# Patient Record
Sex: MALE | Race: WHITE | NOT HISPANIC OR LATINO | Employment: STUDENT | URBAN - METROPOLITAN AREA
[De-identification: names, ages, dates, MRNs, and addresses within clinical notes are randomized per-mention and may not be internally consistent; named-entity substitution may affect disease eponyms.]

---

## 2023-06-07 ENCOUNTER — OFFICE VISIT (OUTPATIENT)
Dept: PEDIATRICS | Facility: CLINIC | Age: 18
End: 2023-06-07
Payer: COMMERCIAL

## 2023-06-07 VITALS — BODY MASS INDEX: 20.69 KG/M2 | WEIGHT: 159 LBS

## 2023-06-07 DIAGNOSIS — R21 RASH: Primary | ICD-10-CM

## 2023-06-07 PROCEDURE — 99213 OFFICE O/P EST LOW 20 MIN: CPT | Performed by: PEDIATRICS

## 2023-06-07 RX ORDER — PREDNISONE 20 MG/1
TABLET ORAL
Qty: 15 TABLET | Refills: 0 | Status: SHIPPED | OUTPATIENT
Start: 2023-06-07

## 2023-06-07 NOTE — PROGRESS NOTES
18-year-old here for rash.  His last visit here was in 2021.  He is overdue for a physical.    The rash is located on his upper thighs bilaterally.  He states the rash has been present for 1 to 2 weeks.  He went to a [facility] clinic last week (6 days ago) where he was diagnosed with ringworm and started on ketoconazole.  The rash had spread since then.    It is pruritic.  He is not taking anything for pruritus.    No history of any swimming recently, nothing new in contact with his skin.  He is not on any medications.    On exam he is in no distress.  Exam was limited to his skin.  He has patches of inflamed erythematous, rough skin on his upper thighs bilaterally.  There is signs of excoriation but no sign of secondary infection.  Several of the lesions are circular in configuration.  Nothing that is pustular or vesicular.    Impression: Contact dermatitis, unclear etiology.    Plan: We will treat with 3 days of oral steroids, can continue up to 5 days if no significant improvement.  He can use topical hydrocortisone.  Stop the ketoconazole.  Recommended using Zyrtec or Claritin for pruritus.  Return if no improvement.